# Patient Record
Sex: MALE | Race: WHITE | NOT HISPANIC OR LATINO | Employment: FULL TIME | URBAN - METROPOLITAN AREA
[De-identification: names, ages, dates, MRNs, and addresses within clinical notes are randomized per-mention and may not be internally consistent; named-entity substitution may affect disease eponyms.]

---

## 2018-03-28 ENCOUNTER — APPOINTMENT (OUTPATIENT)
Dept: RADIOLOGY | Facility: CLINIC | Age: 68
End: 2018-03-28
Payer: MEDICARE

## 2018-03-28 VITALS
HEIGHT: 69 IN | DIASTOLIC BLOOD PRESSURE: 85 MMHG | WEIGHT: 187 LBS | BODY MASS INDEX: 27.7 KG/M2 | SYSTOLIC BLOOD PRESSURE: 144 MMHG | HEART RATE: 76 BPM

## 2018-03-28 DIAGNOSIS — M25.561 CHRONIC PAIN OF RIGHT KNEE: ICD-10-CM

## 2018-03-28 DIAGNOSIS — M25.561 CHRONIC PAIN OF RIGHT KNEE: Primary | ICD-10-CM

## 2018-03-28 DIAGNOSIS — M23.51 RECURRENT RIGHT KNEE INSTABILITY: ICD-10-CM

## 2018-03-28 DIAGNOSIS — G89.29 CHRONIC PAIN OF RIGHT KNEE: ICD-10-CM

## 2018-03-28 DIAGNOSIS — M22.2X1 PATELLOFEMORAL PAIN SYNDROME OF RIGHT KNEE: ICD-10-CM

## 2018-03-28 DIAGNOSIS — G89.29 CHRONIC PAIN OF RIGHT KNEE: Primary | ICD-10-CM

## 2018-03-28 PROCEDURE — 73562 X-RAY EXAM OF KNEE 3: CPT

## 2018-03-28 PROCEDURE — 99203 OFFICE O/P NEW LOW 30 MIN: CPT | Performed by: FAMILY MEDICINE

## 2018-03-28 RX ORDER — MELOXICAM 15 MG/1
15 TABLET ORAL DAILY
Qty: 30 TABLET | Refills: 0 | Status: SHIPPED | OUTPATIENT
Start: 2018-03-28 | End: 2018-05-09 | Stop reason: SDUPTHER
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
## 2018-04-10 ENCOUNTER — EVALUATION (OUTPATIENT)
Dept: PHYSICAL THERAPY | Facility: CLINIC | Age: 68
End: 2018-04-10
Payer: MEDICARE

## 2018-04-10 DIAGNOSIS — M23.51 RECURRENT RIGHT KNEE INSTABILITY: Primary | ICD-10-CM

## 2018-04-10 DIAGNOSIS — M22.2X1 PATELLOFEMORAL PAIN SYNDROME OF RIGHT KNEE: ICD-10-CM

## 2018-04-10 PROCEDURE — G8978 MOBILITY CURRENT STATUS: HCPCS | Performed by: PHYSICAL THERAPIST

## 2018-04-10 PROCEDURE — 97110 THERAPEUTIC EXERCISES: CPT | Performed by: PHYSICAL THERAPIST

## 2018-04-10 PROCEDURE — G8979 MOBILITY GOAL STATUS: HCPCS | Performed by: PHYSICAL THERAPIST

## 2018-04-10 PROCEDURE — 97161 PT EVAL LOW COMPLEX 20 MIN: CPT | Performed by: PHYSICAL THERAPIST

## 2018-04-10 NOTE — PROGRESS NOTES
PT Evaluation     Today's date: 4/10/2018  Patient name: Alfredo Sharif  : 1950  MRN: [id number]  Referring provider: Tami Brink DO  Dx:   Encounter Diagnosis     ICD-10-CM    1  Recurrent right knee instability M23 49 Ambulatory referral to Physical Therapy   2  Patellofemoral pain syndrome of right knee M22 2X1 Ambulatory referral to Physical Therapy                  Assessment  Impairments: activity intolerance and pain with function    Assessment details: Patient is a 77year old male presenting to physical therapy with medial right knee pain  He is currently presenting with signs and symptoms consistent with knee OA and possible meniscus involvement  Patient has contributing factors of decreased and painful left knee ROM  These deficits are limiting the patient's ability to participate in pain free stair management, work activities, and ambulation  Patient will benefit from physical therapy in order to address the deficits contributing to functional limitations and facilitate return to prior level of functioning  Patient was provided a home exercise program and demonstrated an understanding of exercises  Patient was advised to stop performing home exercise program if symptoms increase or new complaints developed  Verbal understanding demonstrated regarding home exercise program instructions  Patient was educated on and agreeable to plan of care       Understanding of Dx/Px/POC: good   Prognosis: fair    Goals  Short term goals:  1) Patient will demonstrate no pain with right knee flexion AROM in 4 weeks  2) Patient will demonstrate decrease in pain  By 20-50% in 4 weeks  3) Patient will demonstrate independence with HEP in 4 weeks  Long term goals:  1) Patient will demonstrate ability to manage stairs with step over step pattern without pain in 6 weeks  2) Patient will demonstrate ability to ambulate >30 minutes without right knee pain in 6 weeks  3) Patient will demonstrate ability to get into equipment at work without right knee pain in 6 weeks        Plan  Patient would benefit from: skilled PT  Planned modality interventions: cryotherapy  Planned therapy interventions: home exercise program, graded motor, graded exercise, gait training, graded activity, functional ROM exercises, flexibility, manual therapy, joint mobilization, Perez taping, therapeutic activities, therapeutic exercise, therapeutic training, transfer training, balance, ADL training, ADL retraining, activity modification, body mechanics training and balance/weight bearing training  Frequency: 2x week  Duration in weeks: 6  Treatment plan discussed with: patient        Subjective Evaluation    History of Present Illness  Mechanism of injury: Patient reports that the pain in his knee started about 8 months ago  Patient reports that he slipped while weed whacking on a hill  He states that he did not notice pain right away  He states that he noticed pain in the knee the next day and it got progressively worse from then on  Patient reports that he had an xray taken of his right knee that revealed that his knee cap was rubbing on his femur  He states that the physician mentioned the possibly a meniscus tear  He states that he was prescribed meloxicam about a week and a half ago  Patient reports that the medication has helped 80%  Patient reports that he has not had injuries to right knee or right knee pain  Patient reports that his goals for physical therapy are to return to walking, manaigng stairs, and work activities wihtout pain  Pain  Current pain ratin  At best pain ratin  At worst pain ratin  Location: Right medial knee pain- intermittent, varying, deep "hard ache"  Quality: sharp    Social Support  Exterior steps/ramp assessed: 7 bilateral handrails  Interior stairs assessed: 1 filight to upstairs one flirght to basement both have handrials     Lives in: multiple-level home  Lives with: spouse and adult children    Working: Own a manufacturing business, 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  Patient reports that he was avoding running machines secondary to knee pain  Exercise history: Raul reports that he is avoding completing yardwork because of the knee  Patient reports that he likes to walk  Walk everyday for a couple of miles  Has not done this for about 7-8 months           Objective     Static Posture     Comments  Toe out during static stand    Tenderness     Right Knee   Tenderness in the medial joint line  No tenderness in the inferior fat pad, patellar tendon, pes anserinus, popliteal fossa and quadriceps tendon  Active Range of Motion   Left Knee   Flexion: 138 degrees   Extension: Left knee active extension: 3He  Right Knee   Flexion: 136 degrees   Extension: 0 degrees     Passive Range of Motion   Left Knee   Extension: 0 degrees     Right Knee   Extension: -1 degrees     Strength/Myotome Testing     Left Knee   Flexion: 5  Extension: 5  Quadriceps contraction: good    Right Knee   Flexion: 5  Extension: 5  Quadriceps contraction: good    Tests     Right Knee   Negative lateral Harvey and medial Harvey       Additional Tests Details  Meniscus Cluster:  Joint line tenderness (+)  Pain with flexion with overpressure (+)  Pain with extension overpressure (-)  Harvey's (-)  History of joint locking (-)    Ambulation     Comments   Toe out bilaterally during ambulation          Precautions: As Tolerated    Daily Treatment Diary     Manual  4/10                                                                                 Exercise Diary  4/10             Calf stretch 5 x 30 sec            SLR 2 x 10            Clamshells 20x                                                                                                                                                                                                                                             Modalities

## 2018-04-12 ENCOUNTER — EVALUATION (OUTPATIENT)
Dept: PHYSICAL THERAPY | Facility: CLINIC | Age: 68
End: 2018-04-12
Payer: MEDICARE

## 2018-04-12 DIAGNOSIS — M23.51 RECURRENT RIGHT KNEE INSTABILITY: Primary | ICD-10-CM

## 2018-04-12 PROCEDURE — 97110 THERAPEUTIC EXERCISES: CPT | Performed by: PHYSICAL THERAPIST

## 2018-04-12 NOTE — PROGRESS NOTES
Daily Note     Today's date: 2018  Patient name: Irene Quintanilla  : 1950  MRN: [id number]  Referring provider: Shawn Ham DO  Dx:   Encounter Diagnosis     ICD-10-CM    1  Recurrent right knee instability M23 49                   Subjective: Patient reports that his knee is having 1-2/10  Patient reports that he had an increase in pain with sitting in the car  Patient reports that the tape helped  Objective: See treatment diary below  Daily Treatment Diary      Manual  4/10  4/12                   Adductor Stretch   TK                    right knee PROM   TK                    right hamstring stretch   TK                                                                         Exercise Diary  4/10  4/12                   Calf stretch 5 x 30 sec  5 x 30 sec                   SLR 2 x 10  2 x 10 1 5#                   Clamshells 20x  20x                    abduction SLR   20x                    standing hip abd/ext   20x RTB                    hamstring Stretch   5 x 30 sec                    butterfly stretch   NV                                                                                                                                                                                                                                                                                                                                                 Modalities                         Perez medial knee X tape   TK                                                                          Assessment: Patient demonstrated full and pain free knee ROM this visit  Patient demonstrating limited adductor muscle length with stretch replicating medial knee discomfort  He will continue to benefit form PT in order to improve adductor muscle length and gluteal strenght  Plan: Continue per plan of care

## 2018-04-17 ENCOUNTER — EVALUATION (OUTPATIENT)
Dept: PHYSICAL THERAPY | Facility: CLINIC | Age: 68
End: 2018-04-17
Payer: MEDICARE

## 2018-04-17 DIAGNOSIS — M22.2X1 PATELLOFEMORAL PAIN SYNDROME OF RIGHT KNEE: ICD-10-CM

## 2018-04-17 DIAGNOSIS — M23.51 RECURRENT RIGHT KNEE INSTABILITY: Primary | ICD-10-CM

## 2018-04-17 PROCEDURE — 97140 MANUAL THERAPY 1/> REGIONS: CPT | Performed by: PHYSICAL THERAPIST

## 2018-04-17 PROCEDURE — 97110 THERAPEUTIC EXERCISES: CPT | Performed by: PHYSICAL THERAPIST

## 2018-04-17 PROCEDURE — 97112 NEUROMUSCULAR REEDUCATION: CPT | Performed by: PHYSICAL THERAPIST

## 2018-04-17 NOTE — PROGRESS NOTES
Daily Note     Today's date: 2018  Patient name: George Ferrera  : 1950  MRN: [id number]  Referring provider: Shaina Green DO  Dx:   Encounter Diagnosis     ICD-10-CM    1  Recurrent right knee instability M23 51    2  Patellofemoral pain syndrome of right knee M22 2X1                   Subjective: Patient reports that he is currently not having any pain in his knee currently  Patient states that he had pain in his knee this morning, however notes that overall he is have less intense pain, less frequently  He states that he liked the tape applied last time as it helped reduce the pain  Objective: See treatment diary below  Manual  4/10  4/12  4/17                 Adductor Stretch   TK  TK                  right knee PROM   TK TK                  right hamstring stretch   TK  TK                                                                       Exercise Diary  4/10  4/12  4/17                 Calf stretch 5 x 30 sec  5 x 30 sec   5 x 30 sec                 SLR 2 x 10  2 x 10 1 5#  2 x 10 1 5#                 Clamshells 20x  20x  20x                  abduction SLR   20x  2 x 10 1 5#                  standing hip abd/ext   20x RTB 20x RTB                  hamstring Stretch   5 x 30 sec  5 x 30 sec                  butterfly stretch   NV  30' x 5                 Lateral walking     RTB 5 laps                                                                                                                                                                                                                                                                                                                       Modalities                       Perez medial knee X tape   TK  TK                                                                       Assessment: Patient did not report any knee pain at end range flexion PROM this date  Perez x tape reapplied this date   Patient demonstrated compensation with TFL during hip abduction activity in sidelying  He noted fatigue post session  He will continue to benefit from PT in order to improve right lower extremity gluteal strength  Plan: Continue per plan of care

## 2018-04-20 ENCOUNTER — EVALUATION (OUTPATIENT)
Dept: PHYSICAL THERAPY | Facility: CLINIC | Age: 68
End: 2018-04-20
Payer: MEDICARE

## 2018-04-20 DIAGNOSIS — M23.51 RECURRENT RIGHT KNEE INSTABILITY: Primary | ICD-10-CM

## 2018-04-20 DIAGNOSIS — M22.2X1 PATELLOFEMORAL PAIN SYNDROME OF RIGHT KNEE: ICD-10-CM

## 2018-04-20 PROCEDURE — 97140 MANUAL THERAPY 1/> REGIONS: CPT | Performed by: PHYSICAL THERAPIST

## 2018-04-20 PROCEDURE — 97112 NEUROMUSCULAR REEDUCATION: CPT | Performed by: PHYSICAL THERAPIST

## 2018-04-20 NOTE — PROGRESS NOTES
Daily Note     Today's date: 2018  Patient name: Bia Pro  : 1950  MRN: [id number]  Referring provider: Alfredo Blackman DO  Dx:   Encounter Diagnosis     ICD-10-CM    1  Recurrent right knee instability M23 49    2  Patellofemoral pain syndrome of right knee M22 2X1                   Subjective: Upon presentationpatient reports his R knee felt great when he woke up this morning  Patient reports he has a little soreness "1/10"  Objective: See treatment diary below  Manual  4/10  4/12  4/17  4/20               Adductor Stretch   TK  TK  JK                right knee PROM   TK TK  JK                right hamstring stretch   TK  TK  JK                                                                     Exercise Diary  4/10  4/12  4/17  4/20               Calf stretch 5 x 30 sec  5 x 30 sec   5 x 30 sec  5x 30 sec               SLR 2 x 10  2 x 10 1 5#  2 x 10 1 5#  2x10               Clamshells 20x  20x  20x  20x RTB                abduction SLR   20x  2 x 10 1 5#  2x10 1 5#                standing hip abd/ext   20x RTB 20x RTB  20x RTB                hamstring Stretch   5 x 30 sec  5 x 30 sec  5x 30 sec                butterfly stretch   NV  30' x 5  5x 30 sec               Lateral walking     RTB 5 laps  RTB 5 laps                                                                                                                                                                                                                                                                                                                     Modalities                     Perez medial knee X tape   TK  TK  TK                                                                     Assessment: Patient denies pain following manual intervention  Continued Perez x tape reapplied by PT TK this visit  Patient continues to demonstrate mild compensation with TFL during sidelying hip ABD   Patient would benefit from continued therapy to improve R knee mobility, increase strength, and return to prior level of functioning  Consider progression of TE Nv      Plan: Continue with POC

## 2018-04-24 ENCOUNTER — EVALUATION (OUTPATIENT)
Dept: PHYSICAL THERAPY | Facility: CLINIC | Age: 68
End: 2018-04-24
Payer: MEDICARE

## 2018-04-24 DIAGNOSIS — M22.2X1 PATELLOFEMORAL PAIN SYNDROME OF RIGHT KNEE: ICD-10-CM

## 2018-04-24 DIAGNOSIS — M23.51 RECURRENT RIGHT KNEE INSTABILITY: Primary | ICD-10-CM

## 2018-04-24 PROCEDURE — 97110 THERAPEUTIC EXERCISES: CPT | Performed by: PHYSICAL THERAPIST

## 2018-04-24 PROCEDURE — 97140 MANUAL THERAPY 1/> REGIONS: CPT | Performed by: PHYSICAL THERAPIST

## 2018-04-24 NOTE — PROGRESS NOTES
Daily Note     Today's date: 2018  Patient name: Sunita Porter  : 1950  MRN: [id number]  Referring provider: Pau Faria DO  Dx:   Encounter Diagnosis     ICD-10-CM    1  Recurrent right knee instability M23 49    2  Patellofemoral pain syndrome of right knee M22 2X1                   Subjective: Patient reports that his knee was feeling about back to "normal"  He decided to do a bunch of yardwork on Saturday and   Patient reports that he felt good following working on Saturday, however was sore yesterday following his work on Monday   Patient reports current pain of 1/10 in medial knee       Objective: See treatment diary below  Objective: See treatment diary below  Manual  4/10  4/12  4/17  4/24               Adductor Stretch   TK  TK  TK                right knee PROM   TK TK  TK                right hamstring stretch   TK  TK  TK                                                                      Exercise Diary  4/10  4/12  4/17  4/24               Calf stretch 5 x 30 sec  5 x 30 sec   5 x 30 sec  5 x 30 sec               SLR 2 x 10  2 x 10 1 5#  2 x 10 1 5# 2 x 10 1 5#               Clamshells 20x  20x  20x  20x                abduction SLR   20x  2 x 10 1 5#  2 x 10 2#                standing hip abd/ext   20x RTB 20x RTB  20x RTB                hamstring Stretch   5 x 30 sec  5 x 30 sec   5 x 30 sec                butterfly stretch   NV  30' x 5  30' x 5               Lateral walking     RTB 5 laps RTB 5 laps                total Gym Squats       Level 3 2 x 10                                                                                                                                                                                                                                                                                             Modalities                     Chris medial knee X tape   TK  TK  TK                                                                     Assessment: Patient reporting muscular fatigue following treatment session  Patient continues to demonstrate tenderness over medial femoral condyle of knee  Perez taping applied to medial condyle to provide relief  Patient will continue to benefit from PT in order to reduce right knee strength and decrease pain  Plan: Continue per plan of care

## 2018-04-27 ENCOUNTER — EVALUATION (OUTPATIENT)
Dept: PHYSICAL THERAPY | Facility: CLINIC | Age: 68
End: 2018-04-27
Payer: MEDICARE

## 2018-04-27 DIAGNOSIS — M23.51 RECURRENT RIGHT KNEE INSTABILITY: Primary | ICD-10-CM

## 2018-04-27 DIAGNOSIS — M22.2X1 PATELLOFEMORAL PAIN SYNDROME OF RIGHT KNEE: ICD-10-CM

## 2018-04-27 PROCEDURE — 97530 THERAPEUTIC ACTIVITIES: CPT

## 2018-04-27 PROCEDURE — 97112 NEUROMUSCULAR REEDUCATION: CPT

## 2018-04-27 PROCEDURE — 97140 MANUAL THERAPY 1/> REGIONS: CPT

## 2018-04-27 NOTE — PROGRESS NOTES
Daily Note     Today's date: 2018  Patient name: Adama Graham  : 1950  MRN: [id number]  Referring provider: Thao Celis DO  Dx: No diagnosis found  Subjective: Upon presnetiaon patient reptos slight discmofrt in the R knee, however reports his knee has been feeling better  Objective: See treatment diary below  Manual  4/10  4/12  4/17  4/24  4/27             Adductor Stretch   TK  TK  TK  JK              right knee PROM   TK TK  TK  JK              right hamstring stretch   TK  TK  TK   JK                                                                   Exercise Diary  4/10  4/12  4/17  4/24  4/27             Calf stretch 5 x 30 sec  5 x 30 sec   5 x 30 sec  5 x 30 sec  5x 30 sec             SLR 2 x 10  2 x 10 1 5#  2 x 10 1 5# 2 x 10 1 5#  2x10 2#             Clamshells 20x  20x  20x  20x  RTB 20x              abduction SLR   20x  2 x 10 1 5#  2 x 10 2#  2x102#              standing hip abd/ext   20x RTB 20x RTB  20x RTB  20X RTB              hamstring Stretch   5 x 30 sec  5 x 30 sec   5 x 30 sec  5x 30 sec              butterfly stretch   NV  30' x 5  30' x 5  30' x 5             Lateral walking     RTB 5 laps RTB 5 laps                total Gym Squats       Level 3 2 x 10  lvl 1 2x10              LE bike           8 min                                                                                                                                                                                                                                                                   Modalities                   Chris medial knee X tape   TK  TK  TK  NP                                                                   Assessment: Patient demonstrates good tolerance to progression in TE this vist  Verbal cuing utilized to facilitate proper technique throughout exercise program  Patient offers no complaints post session, consider additional TE NV  Plan: Cont with POC

## 2018-05-02 ENCOUNTER — APPOINTMENT (OUTPATIENT)
Dept: PHYSICAL THERAPY | Facility: CLINIC | Age: 68
End: 2018-05-02
Payer: MEDICARE

## 2018-05-04 ENCOUNTER — OFFICE VISIT (OUTPATIENT)
Dept: PHYSICAL THERAPY | Facility: CLINIC | Age: 68
End: 2018-05-04
Payer: MEDICARE

## 2018-05-04 DIAGNOSIS — M22.2X1 PATELLOFEMORAL PAIN SYNDROME OF RIGHT KNEE: ICD-10-CM

## 2018-05-04 DIAGNOSIS — M23.51 RECURRENT RIGHT KNEE INSTABILITY: Primary | ICD-10-CM

## 2018-05-04 PROCEDURE — 97110 THERAPEUTIC EXERCISES: CPT

## 2018-05-04 NOTE — PROGRESS NOTES
Daily Note     Today's date: 2018  Patient name: Edy Morales  : 1950  MRN: [id number]  Referring provider: Yenny Real DO  Dx:   Encounter Diagnosis     ICD-10-CM    1  Recurrent right knee instability M23 51    2  Patellofemoral pain syndrome of right knee M22 2X1                   Subjective: Upon presnetiaon patient reports his R knee was bothering him last night and this am a little bit secondary to the weather          Objective: See treatment diary below  Manual  4/10  4/12  4/17  4/24  4/27  5           Adductor Stretch   TK  TK  TK  JK  JK            right knee PROM   TK TK  TK  JK  JK            right hamstring stretch   TK  TK  TK   JK  JK                                                                 Exercise Diary  4/10  4/12  4/17  4/24  4/27  5/4           Calf stretch 5 x 30 sec  5 x 30 sec   5 x 30 sec  5 x 30 sec  5x 30 sec  30 sec x 5           SLR 2 x 10  2 x 10 1 5#  2 x 10 1 5# 2 x 10 1 5#  2x10 2#  2x10 2#           Clamshells 20x  20x  20x  20x  RTB 20x  GTB 20x            abduction SLR   20x  2 x 10 1 5#  2 x 10 2#  2x102#  2x10 2#            standing hip abd/ext   20x RTB 20x RTB  20x RTB  20X RTB  30x GTB            hamstring Stretch   5 x 30 sec  5 x 30 sec   5 x 30 sec  5x 30 sec  5 x30 sec            butterfly stretch   NV  30' x 5  30' x 5  30' x 5  30" x 5           Lateral walking     RTB 5 laps RTB 5 laps    GTB 5 laps            total Gym Squats       Level 3 2 x 10  lvl 1 2x10  lvl 1 2x10            LE bike           8 min  8 min            leg press             100# 2x10            TKE            NV                                                                                                                                                                                                                 Modalities                   Chris medial knee X tape   TK  TK  TK  NP                                                           Assessment: Patient reports decreased discomfort following manual interventions  Patient demonstrates good tolerance to progression in TE this vist  Verbal cuing utilized to facilitate proper technique throughout exercise program  Patient offers no complaints post session  Patient would benefit from continued therapy to increase strength, and return to prior level of functioning  Plan: Cont with POC

## 2018-05-08 ENCOUNTER — OFFICE VISIT (OUTPATIENT)
Dept: PHYSICAL THERAPY | Facility: CLINIC | Age: 68
End: 2018-05-08
Payer: MEDICARE

## 2018-05-08 DIAGNOSIS — M22.2X1 PATELLOFEMORAL PAIN SYNDROME OF RIGHT KNEE: ICD-10-CM

## 2018-05-08 DIAGNOSIS — M23.51 RECURRENT RIGHT KNEE INSTABILITY: Primary | ICD-10-CM

## 2018-05-08 PROCEDURE — 97110 THERAPEUTIC EXERCISES: CPT

## 2018-05-08 PROCEDURE — 97112 NEUROMUSCULAR REEDUCATION: CPT

## 2018-05-08 PROCEDURE — 97140 MANUAL THERAPY 1/> REGIONS: CPT

## 2018-05-08 NOTE — PROGRESS NOTES
Daily Note     Today's date: 2018  Patient name: Uriel Stevenson  : 1950  MRN: [id number]  Referring provider: Daksha Gomes DO  Dx:   Encounter Diagnosis     ICD-10-CM    1  Recurrent right knee instability M23 51    2  Patellofemoral pain syndrome of right knee M22 2X1                   Subjective: Upon presnetiaon patient denies pain in R knee and reports feeling better "overall"         Objective: See treatment diary below  Manual  4/10  4/12  4/17  4/24  4/27  5/4  5         Adductor Stretch   TK  TK  TK  JK  JK  JK          right knee PROM   TK TK  TK  JK  JK  JK          right hamstring stretch   TK  TK  TK   JK  JK  JK                                                               Exercise Diary  4/10  4/12  4/17  4/24  4/27  5/4  5/8         Calf stretch 5 x 30 sec  5 x 30 sec   5 x 30 sec  5 x 30 sec  5x 30 sec  30 sec x 5  30 sec x 5         SLR 2 x 10  2 x 10 1 5#  2 x 10 1 5# 2 x 10 1 5#  2x10 2#  2x10 2#  2x10 2 5#         Clamshells 20x  20x  20x  20x  RTB 20x  GTB 20x  GTB 3x10          abduction SLR   20x  2 x 10 1 5#  2 x 10 2#  2x102#  2x10 2#  2x10 2 5#          standing hip abd/ext   20x RTB 20x RTB  20x RTB  20X RTB  30x GTB  30x GTB          hamstring Stretch   5 x 30 sec  5 x 30 sec   5 x 30 sec  5x 30 sec  5 x30 sec  30 sec x 4           butterfly stretch   NV  30' x 5  30' x 5  30' x 5  30" x 5  NP         Lateral walking     RTB 5 laps RTB 5 laps    GTB 5 laps GTB 5 laps          total Gym Squats       Level 3 2 x 10  lvl 1 2x10  lvl 1 2x10  lvl 1 S/l 2x10          LE bike           8 min  8 min  10 min          leg press             100# 2x10  120# 2x10          TKE            NV  GTB 2x10          step ups               NV                                                                                                                                                                                       Modalities                   Chris horn knee X tape   TK  TK  TK  NP                                                                   Assessment: Patient tolerated exercise progression with out complaints of pain  Improvements in ADD length noted this session  Patient offers no complaints post session  Patient would benefit from continued therapy to increase strength, and return to prior level of functioning  Plan: Cont with POC

## 2018-05-09 VITALS
HEART RATE: 76 BPM | BODY MASS INDEX: 27.56 KG/M2 | DIASTOLIC BLOOD PRESSURE: 82 MMHG | WEIGHT: 186.07 LBS | HEIGHT: 69 IN | SYSTOLIC BLOOD PRESSURE: 124 MMHG

## 2018-05-09 DIAGNOSIS — M23.51 RECURRENT RIGHT KNEE INSTABILITY: ICD-10-CM

## 2018-05-09 DIAGNOSIS — M25.561 CHRONIC PAIN OF RIGHT KNEE: Primary | ICD-10-CM

## 2018-05-09 DIAGNOSIS — G89.29 CHRONIC PAIN OF RIGHT KNEE: Primary | ICD-10-CM

## 2018-05-09 DIAGNOSIS — M22.2X1 PATELLOFEMORAL PAIN SYNDROME OF RIGHT KNEE: ICD-10-CM

## 2018-05-09 PROCEDURE — 99213 OFFICE O/P EST LOW 20 MIN: CPT | Performed by: FAMILY MEDICINE

## 2018-05-09 RX ORDER — MELOXICAM 15 MG/1
15 TABLET ORAL DAILY
Qty: 30 TABLET | Refills: 0 | Status: SHIPPED | OUTPATIENT
Start: 2018-05-09

## 2018-05-09 NOTE — PROGRESS NOTES
Assessment/Plan:  Assessment/Plan   Diagnoses and all orders for this visit:    Chronic pain of right knee  -     meloxicam (MOBIC) 15 mg tablet; Take 1 tablet (15 mg total) by mouth daily    Recurrent right knee instability    Patellofemoral pain syndrome of right knee  -     meloxicam (MOBIC) 15 mg tablet; Take 1 tablet (15 mg total) by mouth daily      24-year-old male with improved right knee pain  Discussed with patient physical exam, impression, and plan  His physical exam is unremarkable for any bony or soft tissue tenderness of the knee, and strength is normal  Recommend that after being discharged from physical therapy he continue with home exercise program and also do exercises for the left knee as well  He may continue with taking meloxicam as needed for pain  He need only follow up with me on an as-needed basis  Subjective:   Patient ID: Blanca Soto is a 77 y o  male  Chief Complaint   Patient presents with    Right Knee - Follow-up       24-year-old male presents for follow-up of right knee pain  He was last seen 6 weeks ago at which point he was assessed to have patellofemoral syndrome and instability of the knee  He was referred to physical therapy  Today reports significant improvement  He states that after having started taking meloxicam he noticed improvement in his pain  He has also been doing physical therapy and home exercises as directed  He is better able to perform daily activities including climbing stairs  He denies any buckling or feeling weak in knee  He reports intermittently he gets mild achy pain at the anterior medial aspect of the knee  He has been more than 1 week without taking meloxicam or any medication for pain and states that he still feels well  He has not had any injury since his last visit  Knee Pain   This is a chronic problem  The current episode started more than 1 month ago  The problem occurs intermittently   The problem has been rapidly improving  Associated symptoms include arthralgias  Pertinent negatives include no joint swelling, myalgias, numbness or weakness  The symptoms are aggravated by walking  He has tried rest and NSAIDs (Physical therapy) for the symptoms  The treatment provided significant relief  Review of Systems   Musculoskeletal: Positive for arthralgias  Negative for joint swelling and myalgias  Neurological: Negative for weakness and numbness  Objective:  Vitals:    05/09/18 1314   BP: 124/82   Pulse: 76   Weight: 84 4 kg (186 lb 1 1 oz)   Height: 5' 9" (1 753 m)     Right Knee Exam     Tenderness   The patient is experiencing no tenderness  Range of Motion   The patient has normal right knee ROM  Muscle Strength     The patient has normal right knee strength  Tests   Harvey:  Medial - negative Lateral - negative  Varus: negative  Valgus: negative    Other   Swelling: none  Other tests: no effusion present      Right Hip Exam     Range of Motion   The patient has normal right hip ROM  Tests   ALEX: negative    Comments:    Negative FADDIR          Observations     Right Knee   Negative for effusion  Physical Exam   Constitutional: He is oriented to person, place, and time  He appears well-developed  No distress  HENT:   Head: Normocephalic and atraumatic  Eyes: Conjunctivae are normal    Neck: No tracheal deviation present  Cardiovascular: Normal rate  Pulmonary/Chest: Effort normal  No respiratory distress  Abdominal: He exhibits no distension  Musculoskeletal:        Right knee: He exhibits no effusion  Neurological: He is alert and oriented to person, place, and time  Skin: Skin is warm and dry  Psychiatric: He has a normal mood and affect  His behavior is normal    Vitals reviewed

## 2018-05-15 ENCOUNTER — OFFICE VISIT (OUTPATIENT)
Dept: PHYSICAL THERAPY | Facility: CLINIC | Age: 68
End: 2018-05-15
Payer: MEDICARE

## 2018-05-15 DIAGNOSIS — M23.51 RECURRENT RIGHT KNEE INSTABILITY: Primary | ICD-10-CM

## 2018-05-15 PROCEDURE — 97112 NEUROMUSCULAR REEDUCATION: CPT | Performed by: PHYSICAL THERAPIST

## 2018-05-15 PROCEDURE — G8979 MOBILITY GOAL STATUS: HCPCS | Performed by: PHYSICAL THERAPIST

## 2018-05-15 PROCEDURE — G8980 MOBILITY D/C STATUS: HCPCS | Performed by: PHYSICAL THERAPIST

## 2018-05-15 PROCEDURE — 97110 THERAPEUTIC EXERCISES: CPT | Performed by: PHYSICAL THERAPIST

## 2018-05-15 NOTE — PROGRESS NOTES
PT Discharge    Today's date: 5/15/2018  Patient name: Shon Buerger  : 1950  MRN: [id number]  Referring provider: Yin Bang DO  Dx:   Encounter Diagnosis     ICD-10-CM    1  Recurrent right knee instability M23 51                   Assessment  Impairments: activity intolerance and pain with function    Assessment details:   At this time, patient has achieved their maximum functional benefit from skilled physical therapy services and will be discharged to their HEP  Patient is in agreement with the plan of care  As a result, patient is discharged from physical therapy  Understanding of Dx/Px/POC: good   Prognosis: fair    Goals  Short term goals:  1) Patient will demonstrate no pain with right knee flexion AROM in 4 weeks- met  2) Patient will demonstrate decrease in pain  By 20-50% in 4 weeks-met  3) Patient will demonstrate independence with HEP in 4 weeks- met  Long term goals:  1) Patient will demonstrate ability to manage stairs with step over step pattern without pain in 6 weeks- Met  2) Patient will demonstrate ability to ambulate >30 minutes without right knee pain in 6 weeks-Met  3) Patient will demonstrate ability to get into equipment at work without right knee pain in 6 weeks- Met        Plan  Patient would benefit from: skilled PT  Planned modality interventions: cryotherapy        Subjective Evaluation    History of Present Illness  Mechanism of injury: Patient reports that since starting physical therapy, hew has noticed improvements in his knee  Patient reports that he no longer gets pain, however gets occasional discomfort  Patient reports that he wears his brace at work, however no longer wears the brace while completing yard work at his house  Patient reports that he has returned to 90% PLOF     Pain  Current pain ratin  At best pain ratin  At worst pain rating: 3  Location: Right medial knee pain- intermittent, varying, deep "hard ache"  Quality: sharp    Social Support  Exterior steps/ramp assessed: 7 bilateral handrails  Interior stairs assessed: 1 filight to upstairs one flirght to basement both have handrials  Lives in: multiple-level home  Lives with: spouse and adult children    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, 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  Patient reports that he was avoding running machines secondary to knee pain  Exercise history: Raul reports that he is avoding completing yardwork because of the knee  Patient reports that he likes to walk  Walk everyday for a couple of miles  Has not done this for about 7-8 months           Objective     Static Posture     Comments  Toe out during static stand    Tenderness     Right Knee   Tenderness in the medial joint line  No tenderness in the inferior fat pad, patellar tendon, pes anserinus, popliteal fossa and quadriceps tendon  Active Range of Motion   Left Knee   Flexion: 138 degrees   Extension: Left knee active extension: 3He  Right Knee   Flexion: 136 degrees   Extension: 0 degrees     Passive Range of Motion   Left Knee   Extension: 0 degrees     Right Knee   Extension: -1 degrees     Strength/Myotome Testing     Left Knee   Flexion: 5  Extension: 5  Quadriceps contraction: good    Right Knee   Flexion: 5  Extension: 5  Quadriceps contraction: good    Tests     Right Knee   Negative lateral Harvey and medial Harvey       Additional Tests Details  Meniscus Cluster:  Joint line tenderness (+)  Pain with flexion with overpressure (+)  Pain with extension overpressure (-)  Harvey's (-)  History of joint locking (-)    Ambulation     Comments   Toe out bilaterally during ambulation          Precautions: As Tolerated    Daily Treatment Diary   Objective: See treatment diary below  Manual  4/10  4/12  4/17  4/24  4/27  5/4  5/8         Adductor Stretch   TK  TK  TK  FARIDA IQBAL          right knee PROM   TK TK  TK  DAMIANK  FARIDA IQBAL          right hamstring stretch   TK  TK  TK   FARIDA IQBAL                                                               Exercise Diary  4/10  4/12  4/17  4/24  4/27  5/4  5/8  5/15       Calf stretch 5 x 30 sec  5 x 30 sec   5 x 30 sec  5 x 30 sec  5x 30 sec  30 sec x 5  30 sec x 5  30 sec x 5       SLR 2 x 10  2 x 10 1 5#  2 x 10 1 5# 2 x 10 1 5#  2x10 2#  2x10 2#  2x10 2 5#  2x10 2 5#       Clamshells 20x  20x  20x  20x  RTB 20x  GTB 20x  GTB 3x10   GTB 3x10        abduction SLR   20x  2 x 10 1 5#  2 x 10 2#  2x102#  2x10 2#  2x10 2 5#   2x10 2 5#        standing hip abd/ext   20x RTB 20x RTB  20x RTB  20X RTB  30x GTB  30x GTB 30x GTB        hamstring Stretch   5 x 30 sec  5 x 30 sec   5 x 30 sec  5x 30 sec  5 x30 sec  30 sec x 4   30 sec x 4         butterfly stretch   NV  30' x 5  30' x 5  30' x 5  30" x 5  NP         Lateral walking     RTB 5 laps RTB 5 laps    GTB 5 laps GTB 5 laps  GTB 5 laps        total Gym Squats       Level 3 2 x 10  lvl 1 2x10  lvl 1 2x10  lvl 1 S/l 2x10   lvl 1 S/l 2x10        LE bike           8 min  8 min  10 min  10 min        leg press             100# 2x10  120# 2x10  120# 2x10        TKE            NV  GTB 2x10   GTB 2x10        step ups               NV                                                                                                                                                                                       Modalities     4/12 4 17 4/24 4/27              Chris medial knee X tape   TK  TK  TK  NP